# Patient Record
Sex: FEMALE | Race: WHITE | Employment: UNEMPLOYED | URBAN - METROPOLITAN AREA
[De-identification: names, ages, dates, MRNs, and addresses within clinical notes are randomized per-mention and may not be internally consistent; named-entity substitution may affect disease eponyms.]

---

## 2021-10-26 NOTE — PROGRESS NOTES
FAMILY MEDICINE CLINIC NOTE    HPI  Tereza Root is a 39year old female presenting for physical    #Health Maintenance  -Diet: Eats fruits and vegetables, dairy, meat.    -Exercise: No exercise  -Lung cancer screen: Not indicated  -Colon cancer scree current outpatient medications on file.        ACTIVE PROBLEM  Patient Active Problem List:     Hyperhidrosis     Health maintenance examination      PAST MEDICAL HISTORY  Past Medical History:   Diagnosis Date   • Acne rosacea    • Hyperhidrosis    • Lenti Family: Not on file      Frequency of Social Gatherings with Friends and Family: Not on file      Attends Buddhist Services: Not on file      Active Member of Clubs or Organizations: Not on file      Attends Club or Organization Meetings: Not on file      OSMOCALC, GFRNAA, GFRAA, ALT, AST, ALKPHO, BILT, TP, ALB, GLOBULIN, ELECTAG, FASTING      No results found for: CHOLEST, TRIG, HDL, LDL, VLDL, TCHDLRATIO, NONHDLC, CHOLHDLRATIO, NONHDLC, CALCNONHDL     DIAGNOSTICS    ASSESSMENT/PLAN  Problem List Items Add

## 2021-10-27 ENCOUNTER — OFFICE VISIT (OUTPATIENT)
Dept: INTERNAL MEDICINE CLINIC | Facility: CLINIC | Age: 36
End: 2021-10-27
Payer: COMMERCIAL

## 2021-10-27 VITALS
BODY MASS INDEX: 24.36 KG/M2 | WEIGHT: 146.19 LBS | HEIGHT: 65 IN | DIASTOLIC BLOOD PRESSURE: 70 MMHG | OXYGEN SATURATION: 99 % | SYSTOLIC BLOOD PRESSURE: 112 MMHG | HEART RATE: 78 BPM

## 2021-10-27 DIAGNOSIS — Z00.00 HEALTH MAINTENANCE EXAMINATION: Primary | ICD-10-CM

## 2021-10-27 PROBLEM — E03.8 SUBCLINICAL HYPOTHYROIDISM: Status: ACTIVE | Noted: 2021-10-27

## 2021-10-27 PROBLEM — E78.5 DYSLIPIDEMIA: Status: ACTIVE | Noted: 2021-10-27

## 2021-10-27 PROBLEM — E78.00 HYPERCHOLESTEROLEMIA: Status: ACTIVE | Noted: 2021-10-27

## 2021-10-27 PROBLEM — E78.00 HYPERCHOLESTEROLEMIA: Status: RESOLVED | Noted: 2021-10-27 | Resolved: 2021-10-27

## 2021-10-27 PROCEDURE — 96127 BRIEF EMOTIONAL/BEHAV ASSMT: CPT | Performed by: FAMILY MEDICINE

## 2021-10-27 PROCEDURE — 80061 LIPID PANEL: CPT | Performed by: FAMILY MEDICINE

## 2021-10-27 PROCEDURE — 80050 GENERAL HEALTH PANEL: CPT | Performed by: FAMILY MEDICINE

## 2021-10-27 PROCEDURE — 3078F DIAST BP <80 MM HG: CPT | Performed by: FAMILY MEDICINE

## 2021-10-27 PROCEDURE — 83036 HEMOGLOBIN GLYCOSYLATED A1C: CPT | Performed by: FAMILY MEDICINE

## 2021-10-27 PROCEDURE — 3074F SYST BP LT 130 MM HG: CPT | Performed by: FAMILY MEDICINE

## 2021-10-27 PROCEDURE — 36415 COLL VENOUS BLD VENIPUNCTURE: CPT | Performed by: FAMILY MEDICINE

## 2021-10-27 PROCEDURE — 84439 ASSAY OF FREE THYROXINE: CPT | Performed by: FAMILY MEDICINE

## 2021-10-27 PROCEDURE — 3008F BODY MASS INDEX DOCD: CPT | Performed by: FAMILY MEDICINE

## 2021-10-27 PROCEDURE — 99385 PREV VISIT NEW AGE 18-39: CPT | Performed by: FAMILY MEDICINE

## 2021-10-27 PROCEDURE — 86803 HEPATITIS C AB TEST: CPT | Performed by: FAMILY MEDICINE

## 2021-10-27 NOTE — ASSESSMENT & PLAN NOTE
Patient needing paperwork completed prior to end of month, not sure if she wants to formally establish care here. Exercise and diet advised.   CBC, CMP, lipid panel, Hba1c, TSH, hepatitis C screen,   Flu shot declined  Advanced directive information provid

## 2021-10-27 NOTE — PROGRESS NOTES
Pt presented to clinic today for blood draw. Per physician able to draw orders. Orders  documented within chart. Pt tolerated lab draw well.  verified.   Orders drawn include: CBC, CMP, lipid panel, Hba1c, TSH, hepatitis C screen  Site of draw: right arm

## 2021-10-27 NOTE — PATIENT INSTRUCTIONS
PATIENT INSTRUCTIONS    • Thank you for seeing me today, it was a pleasure taking care of you. • Please check out at the  and schedule a follow up appointment.   Return in about 1 year (around 10/27/2022) for physical.  • Please get your labs jose cruz